# Patient Record
Sex: MALE | Race: WHITE | URBAN - METROPOLITAN AREA
[De-identification: names, ages, dates, MRNs, and addresses within clinical notes are randomized per-mention and may not be internally consistent; named-entity substitution may affect disease eponyms.]

---

## 2017-01-04 ENCOUNTER — OFFICE VISIT (OUTPATIENT)
Dept: NEUROSURGERY | Facility: CLINIC | Age: 27
End: 2017-01-04

## 2017-01-04 VITALS
BODY MASS INDEX: 25.15 KG/M2 | DIASTOLIC BLOOD PRESSURE: 83 MMHG | SYSTOLIC BLOOD PRESSURE: 143 MMHG | HEART RATE: 102 BPM | HEIGHT: 75 IN | WEIGHT: 202.3 LBS

## 2017-01-04 DIAGNOSIS — G91.9 HYDROCEPHALUS (H): Primary | ICD-10-CM

## 2017-01-04 ASSESSMENT — PAIN SCALES - GENERAL: PAINLEVEL: NO PAIN (0)

## 2017-01-04 NOTE — NURSING NOTE
Chief Complaint   Patient presents with     RECHECK     [facility] RETURN - 2 WK POST OP SUTURE REMOVAL AND WOUND CHECK     Comfort Quinonez MA

## 2017-01-04 NOTE — Clinical Note
1/4/2017       RE: Farhat Pascal  [address] [address] [facility] [place] [place] [address]     Dear Colleague,    Thank you for referring your patient, Farhat Pascal, to the [facility] NEUROSURGERY at [facility]. Please see a copy of my visit note below.      Neurosurgery clinic post op wound check  Date of visit: 1/4/2017      Date of Surgery: 12/23/16 by Dr Mendoza @[facility].  Procedure:  Endoscopic 3rd ventriculostomy via a right frontal approach     Implants:    Synthes cranial plating system  Pathology:        HPI:   Farhat Pascal is a pleasant 26 year old male now 2 weeks status post the above procedure for exertional headache with an increase in frequency of migraine headaches as well as impaired night vision and tinnitus.  He saw an optometrist recently who noted bilateral papilledema on dilated retinal exam.  Imaging was obtained which demonstrated significant expansion of the lateral ventricles above the 3rd ventricle in addition to enlarged 3rd ventricle with corresponding stenosis of the cerebral aqueduct.  The patient denies any episodes of nausea or vomiting recently or any changes in gait or loss of urinary continence.  He does, however, state that his headaches have gotten worse and he feels that his vision has gotten worse as well and due to this constellation of symptoms and imaging findings we discussed with him the options for treatment of hydrocephalus in great detail and after thorough discussion of risks and benefits it was decided to proceed forth with the procedure as above mentioned, a 3rd ventriculostomy.   The procedure itself was without incident.  He recovered well and was discharged home on POD 1 in good condition.  Since then he has done very well and his headaches are gone.  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  He presents for routine wound check and suture/staple removal.    STATUS REPORT  Patient Supplied Answers To the UC Pain Questionnaire  UC  Pain -  Patient Entered Questionnaire/Answers 1/4/2017   What number best describes your pain right now:  0 = No pain  to  10 = Worst pain imaginable 0   What number best describes your average pain for the past week:  0 = No pain  to  10 = Worst pain imaginable 0   What number best describes your LOWEST pain in past 24 hours:  0 = No pain  to  10 = Worst pain imaginable 0   What number best describes your WORST pain in past 24 hours:  0 = No pain  to  10 = Worst pain imaginable 0   What non-medicine treatments have you already had for your pain? -   Have you tried treating your pain with medication?  No   Are you currently taking medications for your pain? No         Current outpatient prescriptions:      oxyCODONE (ROXICODONE) 5 MG IR tablet, Take 1-2 tablets (5-10 mg) by mouth every 3 hours as needed for moderate to severe pain, Disp: 60 tablet, Rfl: 0     acetaminophen (TYLENOL) 325 MG tablet, Take 2 tablets (650 mg) by mouth every 4 hours as needed for other (surgical pain), Disp: 100 tablet, Rfl: 0     ondansetron (ZOFRAN-ODT) 4 MG ODT tab, Take 1-2 tablets (4-8 mg) by mouth every 6 hours as needed for nausea, Disp: 120 tablet, Rfl: 0     senna-docusate (SENOKOT-S;PERICOLACE) 8.6-50 MG per tablet, Take 1-2 tablets by mouth 2 times daily, Disp: 100 tablet, Rfl: 0     amphetamine-dextroamphetamine (ADDERALL XR) 20 MG per 24 hr capsule, Take 20 mg by mouth every morning , Disp: , Rfl:      amphetamine-dextroamphetamine (ADDERALL) 5 MG per tablet, Take 5 mg by mouth daily as needed (in the afternoon if needed.) , Disp: , Rfl:      escitalopram (LEXAPRO) 10 MG tablet, Take 10 mg by mouth every morning , Disp: , Rfl:      Multiple Vitamin (MULTI-VITAMINS) TABS, Take 1 tablet by mouth, Disp: , Rfl:   Allergies   Allergen Reactions     Metoprolol      Nausea/haziness/fatigue; dose not clear.      Primidone      Was in a haze.      PMH, SOC HIST, FAM HIST, PROBLEM LIST:  All reviewed in EPIC.    OBJECTIVE:  /83  "mmHg  Pulse 102  Ht 1.905 m (6' 3\")  Wt 91.763 kg (202 lb 4.8 oz)  BMI 25.29 kg/m2    Imaging:  None new.    EXAM:  Well developed well nourished male found seated comfortably in exam chair.  No apparent distress. He is accompanied by his mother.  A&O X3.  Mood and affect WNL. Language and fund of knowledge intact.  Is able to sit and rise independently.   He has a nicely healed incision.  I prepped the wound with chloroprep and cleanly removed tissue glue without difficulty.    Assessment:  1. Hydrocephalus      Farhat Pascal is doing well after his fenestration, his headaches are gone.  The wound is healthy.     PLAN:  We discussed wound care.  *  May shower and shampoo with mild shampoo including the incision. No hair conditioners, hair treatments or skin cremes for two more weeks.  We discussed medication.    *  Medications prescribed today:    none  We discussed activities and return to work.  *   We recommend he return to normal activities in two weeks as able.  *   He can return to driving if: he is off narcotic.  *  He can return to work with these limits:  No lifting over 20 pounds. I filled out a RTW form today.    We discussed follow up   *  All the patient's questions have been answered and they demonstrate good understanding of the above.    *  Return to clinic to see Dr Mendoza in 2 weeks or so, he has the appointment and the appointment for a CT already.  *   The patient has our contact information and is aware that he should call if he has questions comments or concerns.     We appreciate the opportunity to be of service in the care of this pleasant patient.  Please do call if there is anything more we can do    Nichole Vasques PA-C  [facility]  Department of Neurosurgery  Phone: [phone]  Fax: [phone]    "

## 2017-01-05 NOTE — PROGRESS NOTES
Neurosurgery clinic post op wound check  Date of visit: 1/4/2017      Date of Surgery: 12/23/16 by Dr Mendoza @[facility].  Procedure:  Endoscopic 3rd ventriculostomy via a right frontal approach     Implants:    Synthes cranial plating system  Pathology:        HPI:   Farhat Pascal is a pleasant 26 year old male now 2 weeks status post the above procedure for exertional headache with an increase in frequency of migraine headaches as well as impaired night vision and tinnitus.  He saw an optometrist recently who noted bilateral papilledema on dilated retinal exam.  Imaging was obtained which demonstrated significant expansion of the lateral ventricles above the 3rd ventricle in addition to enlarged 3rd ventricle with corresponding stenosis of the cerebral aqueduct.  The patient denies any episodes of nausea or vomiting recently or any changes in gait or loss of urinary continence.  He does, however, state that his headaches have gotten worse and he feels that his vision has gotten worse as well and due to this constellation of symptoms and imaging findings we discussed with him the options for treatment of hydrocephalus in great detail and after thorough discussion of risks and benefits it was decided to proceed forth with the procedure as above mentioned, a 3rd ventriculostomy.   The procedure itself was without incident.  He recovered well and was discharged home on POD 1 in good condition.  Since then he has done very well and his headaches are gone.  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  He presents for routine wound check and suture/staple removal.    STATUS REPORT  Patient Supplied Answers To the UC Pain Questionnaire  [facility] Pain -  Patient Entered Questionnaire/Answers 1/4/2017   What number best describes your pain right now:  0 = No pain  to  10 = Worst pain imaginable 0   What number best describes your average pain for the past week:  0 = No pain  to  10 = Worst pain imaginable 0   What number  "best describes your LOWEST pain in past 24 hours:  0 = No pain  to  10 = Worst pain imaginable 0   What number best describes your WORST pain in past 24 hours:  0 = No pain  to  10 = Worst pain imaginable 0   What non-medicine treatments have you already had for your pain? -   Have you tried treating your pain with medication?  No   Are you currently taking medications for your pain? No         Current outpatient prescriptions:      oxyCODONE (ROXICODONE) 5 MG IR tablet, Take 1-2 tablets (5-10 mg) by mouth every 3 hours as needed for moderate to severe pain, Disp: 60 tablet, Rfl: 0     acetaminophen (TYLENOL) 325 MG tablet, Take 2 tablets (650 mg) by mouth every 4 hours as needed for other (surgical pain), Disp: 100 tablet, Rfl: 0     ondansetron (ZOFRAN-ODT) 4 MG ODT tab, Take 1-2 tablets (4-8 mg) by mouth every 6 hours as needed for nausea, Disp: 120 tablet, Rfl: 0     senna-docusate (SENOKOT-S;PERICOLACE) 8.6-50 MG per tablet, Take 1-2 tablets by mouth 2 times daily, Disp: 100 tablet, Rfl: 0     amphetamine-dextroamphetamine (ADDERALL XR) 20 MG per 24 hr capsule, Take 20 mg by mouth every morning , Disp: , Rfl:      amphetamine-dextroamphetamine (ADDERALL) 5 MG per tablet, Take 5 mg by mouth daily as needed (in the afternoon if needed.) , Disp: , Rfl:      escitalopram (LEXAPRO) 10 MG tablet, Take 10 mg by mouth every morning , Disp: , Rfl:      Multiple Vitamin (MULTI-VITAMINS) TABS, Take 1 tablet by mouth, Disp: , Rfl:   Allergies   Allergen Reactions     Metoprolol      Nausea/haziness/fatigue; dose not clear.      Primidone      Was in a haze.      PMH, SOC HIST, FAM HIST, PROBLEM LIST:  All reviewed in EPIC.    OBJECTIVE:  /83 mmHg  Pulse 102  Ht 1.905 m (6' 3\")  Wt 91.763 kg (202 lb 4.8 oz)  BMI 25.29 kg/m2    Imaging:  None new.    EXAM:  Well developed well nourished male found seated comfortably in exam chair.  No apparent distress. He is accompanied by his mother.  A&O X3.  Mood and affect " WNL. Language and fund of knowledge intact.  Is able to sit and rise independently.   He has a nicely healed incision.  I prepped the wound with chloroprep and cleanly removed tissue glue without difficulty.    Assessment:  1. Hydrocephalus      Farhat aPscal is doing well after his fenestration, his headaches are gone.  The wound is healthy.     PLAN:  We discussed wound care.  *  May shower and shampoo with mild shampoo including the incision. No hair conditioners, hair treatments or skin cremes for two more weeks.  We discussed medication.    *  Medications prescribed today:    none  We discussed activities and return to work.  *   We recommend he return to normal activities in two weeks as able.  *   He can return to driving if: he is off narcotic.  *  He can return to work with these limits:  No lifting over 20 pounds. I filled out a RTW form today.    We discussed follow up   *  All the patient's questions have been answered and they demonstrate good understanding of the above.    *  Return to clinic to see Dr Mendoza in 2 weeks or so, he has the appointment and the appointment for a CT already.  *   The patient has our contact information and is aware that he should call if he has questions comments or concerns.     We appreciate the opportunity to be of service in the care of this pleasant patient.  Please do call if there is anything more we can do    Nichole Vasques PA-C  [facility]  Department of Neurosurgery  Phone: [phone]  Fax: [phone]

## 2017-01-31 ENCOUNTER — OFFICE VISIT (OUTPATIENT)
Dept: NEUROSURGERY | Facility: CLINIC | Age: 27
End: 2017-01-31

## 2017-01-31 VITALS
WEIGHT: 200 LBS | BODY MASS INDEX: 24.87 KG/M2 | HEIGHT: 75 IN | RESPIRATION RATE: 20 BRPM | OXYGEN SATURATION: 98 % | DIASTOLIC BLOOD PRESSURE: 106 MMHG | HEART RATE: 113 BPM | SYSTOLIC BLOOD PRESSURE: 147 MMHG

## 2017-01-31 DIAGNOSIS — G91.9 HYDROCEPHALUS (H): Primary | ICD-10-CM

## 2017-01-31 RX ORDER — DEXTROAMPHETAMINE SACCHARATE, AMPHETAMINE ASPARTATE MONOHYDRATE, DEXTROAMPHETAMINE SULFATE AND AMPHETAMINE SULFATE 7.5; 7.5; 7.5; 7.5 MG/1; MG/1; MG/1; MG/1
30 CAPSULE, EXTENDED RELEASE ORAL DAILY
COMMUNITY
Start: 2017-01-27

## 2017-01-31 ASSESSMENT — PAIN SCALES - GENERAL: PAINLEVEL: NO PAIN (0)

## 2017-01-31 NOTE — MR AVS SNAPSHOT
"              After Visit Summary   1/31/2017    Farhat Pascal    MRN: [id number]           Patient Information     Date Of Birth          1990        Visit Information        Provider Department      1/31/2017 3:40 PM Federico Mendoza MD [facility] Neurosurgery        Today's Diagnoses     Hydrocephalus    -  1       Follow-ups after your visit        Who to contact     Please call your clinic at [phone] to:    Ask questions about your health    Make or cancel appointments    Discuss your medicines    Learn about your test results    Speak to your doctor   If you have compliments or concerns about an experience at your clinic, or if you wish to file a complaint, please contact [facility] Patient Relations at [phone] or email us at [email or web address]         Additional Information About Your Visit        MyChart Information     [facility] gives you secure access to your electronic health record. If you see a primary care provider, you can also send messages to your care team and make appointments. If you have questions, please call your primary care clinic.  If you do not have a primary care provider, please call [phone] and they will assist you.      [facility] is an electronic gateway that provides easy, online access to your medical records. With [facility], you can request a clinic appointment, read your test results, renew a prescription or communicate with your care team.     To access your existing account, please contact your [facility] or call [phone] for assistance.        Care EveryWhere ID     This is your Care EveryWhere ID. This could be used by other organizations to access your [place] medical records  [id number]        Your Vitals Were     Pulse Respirations Height Pulse Oximetry BMI (Body Mass Index)       113 20 1.905 m (6' 3\") 98% 25 kg/m2        Blood Pressure from Last 3 Encounters:   01/31/17 (!) " 147/106   01/04/17 143/83   12/28/16 (!) 152/91    Weight from Last 3 Encounters:   01/31/17 90.7 kg (200 lb)   01/04/17 91.8 kg (202 lb 4.8 oz)   12/23/16 91.7 kg (202 lb 2.6 oz)              Today, you had the following     No orders found for display         Today's Medication Changes          These changes are accurate as of: 1/31/17 11:59 PM.  If you have any questions, ask your nurse or doctor.               These medicines have changed or have updated prescriptions.        Dose/Directions    amphetamine-dextroamphetamine 30 MG per 24 hr capsule   Commonly known as:  ADDERALL XR   This may have changed:  Another medication with the same name was removed. Continue taking this medication, and follow the directions you see here.   Changed by:  Federico Mendoza MD        Dose:  30 mg   Take 30 mg by mouth daily   Refills:  0         Stop taking these medicines if you haven't already. Please contact your care team if you have questions.     ondansetron 4 MG ODT tab   Commonly known as:  ZOFRAN-ODT   Stopped by:  Federico Mendoza MD           senna-docusate 8.6-50 MG per tablet   Commonly known as:  SENOKOT-S;PERICOLACE   Stopped by:  Federico Mendoza MD                    Primary Care Provider Office Phone # Fax #    Meena Marlen Espinoza [phone]       [facility] GENERAL MEDICINE [address] [address]        Thank you!     Thank you for choosing [facility] NEUROSURGERY  for your care. Our goal is always to provide you with excellent care. Hearing back from our patients is one way we can continue to improve our services. Please take a few minutes to complete the written survey that you may receive in the mail after your visit with us. Thank you!             Your Updated Medication List - Protect others around you: Learn how to safely use, store and throw away your medicines at www.disposemymeds.org.          This list is accurate as of: 1/31/17 11:59 PM.   Always use your most recent med list.                   Brand Name Dispense Instructions for use    amphetamine-dextroamphetamine 30 MG per 24 hr capsule    ADDERALL XR     Take 30 mg by mouth daily       FLUoxetine 20 MG capsule    PROzac     Take 20 mg by mouth daily       MULTI-VITAMINS Tabs      Take 1 tablet by mouth

## 2017-01-31 NOTE — LETTER
1/31/2017       RE: Farhat Pascal  [address]  [place] [address]     Dear Colleague,    Thank you for referring your patient, Farhat Pascal, to the [facility] NEUROSURGERY at [facility]. Please see a copy of my visit note below.    January 31, 2017      Meena Espinoza M.D.      RE:  Farhat Pascal      Dear Dr. 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      Since the third ventriculostomy, Farhat has been doing extremely well.  He has not had any headaches since surgery and the ringing in his ears has completely dissipated at this point in time.  He believes that his vision is also better at night, but this still is not back to baseline per se.      Regarding some of his other symptoms of tremor and hyperhidrosis, these are still problematic for him, although the tremor, he thinks, is slightly better.      Overall, I am absolutely thrilled with how Farhat is doing.  We reviewed his CT today that does demonstrate summer reduction in the ventricular size but what is most impressive is that you now can see the sulcal pattern of the brain where before you could not see that at all, indicative of severe pressure.      Again, I think he is doing great and at this point, he is going to follow up on a p.r.n. basis.      I spent approximately 20 minutes with Farhat today, with the majority of this time spent in consultation and developing a treatment plan.      Thank you for your trust and the opportunity to participate in Farhat's care.  If you have any further questions or concerns, please feel free to contact me on my cell phone at [phone].         CHANDLER GUTIERREZ MD             D: 01/31/2017 16:28   T: 02/01/2017 07:57   MT: MADHAV      Name:     FARHAT PASCAL   MRN:      [id number]         Account:      [id number]   :      1990           Service Date: 2017      Document: [id number]

## 2017-01-31 NOTE — NURSING NOTE
Chief Complaint   Patient presents with     RECHECK     [facility]- VENTRICULOSTOMY FOR OBSTRUCTIVE HYDROCEPHALUS, CT RESULTS

## 2017-02-01 NOTE — PROGRESS NOTES
2017      Meena Espinoza M.D.      RE:  Homero Pascal      Dear Dr. 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      Since the third ventriculostomy, Homero has been doing extremely well.  He has not had any headaches since surgery and the ringing in his ears has completely dissipated at this point in time.  He believes that his vision is also better at night, but this still is not back to baseline per se.      Regarding some of his other symptoms of tremor and hyperhidrosis, these are still problematic for him, although the tremor, he thinks, is slightly better.      Overall, I am absolutely thrilled with how Homero is doing.  We reviewed his CT today that does demonstrate summer reduction in the ventricular size but what is most impressive is that you now can see the sulcal pattern of the brain where before you could not see that at all, indicative of severe pressure.      Again, I think he is doing great and at this point, he is going to follow up on a p.r.n. basis.      I spent approximately 20 minutes with Homero today, with the majority of this time spent in consultation and developing a treatment plan.      Thank you for your trust and the opportunity to participate in Homero's care.  If you have any further questions or concerns, please feel free to contact me on my cell phone at [phone].         CHANDLER GUTIERREZ MD             D: 2017 16:28   T: 2017 07:57   MT: MADHAV      Name:     HOMERO PASCAL   MRN:      [id number]        Account:      [id number]   :      1990           Service Date: 2017      Document: [id number]

## 2017-03-12 ENCOUNTER — DOCUMENTATION ONLY (OUTPATIENT)
Dept: OTHER | Facility: CLINIC | Age: 27
End: 2017-03-12

## 2017-03-12 DIAGNOSIS — Z71.89 ACP (ADVANCE CARE PLANNING): Chronic | ICD-10-CM

## 2020-03-10 ENCOUNTER — HEALTH MAINTENANCE LETTER (OUTPATIENT)
Age: 30
End: 2020-03-10

## 2020-12-27 ENCOUNTER — HEALTH MAINTENANCE LETTER (OUTPATIENT)
Age: 30
End: 2020-12-27

## 2021-04-24 ENCOUNTER — HEALTH MAINTENANCE LETTER (OUTPATIENT)
Age: 31
End: 2021-04-24

## 2021-10-04 ENCOUNTER — HEALTH MAINTENANCE LETTER (OUTPATIENT)
Age: 31
End: 2021-10-04

## 2022-05-15 ENCOUNTER — HEALTH MAINTENANCE LETTER (OUTPATIENT)
Age: 32
End: 2022-05-15

## 2022-09-11 ENCOUNTER — HEALTH MAINTENANCE LETTER (OUTPATIENT)
Age: 32
End: 2022-09-11

## 2023-06-03 ENCOUNTER — HEALTH MAINTENANCE LETTER (OUTPATIENT)
Age: 33
End: 2023-06-03